# Patient Record
Sex: FEMALE | Race: WHITE | NOT HISPANIC OR LATINO | Employment: FULL TIME | URBAN - METROPOLITAN AREA
[De-identification: names, ages, dates, MRNs, and addresses within clinical notes are randomized per-mention and may not be internally consistent; named-entity substitution may affect disease eponyms.]

---

## 2019-05-15 ENCOUNTER — HISTORICAL (OUTPATIENT)
Dept: LAB | Facility: HOSPITAL | Age: 29
End: 2019-05-15

## 2019-06-11 ENCOUNTER — HISTORICAL (OUTPATIENT)
Dept: LAB | Facility: HOSPITAL | Age: 29
End: 2019-06-11

## 2019-07-19 ENCOUNTER — HISTORICAL (OUTPATIENT)
Dept: LAB | Facility: HOSPITAL | Age: 29
End: 2019-07-19

## 2019-10-01 ENCOUNTER — HISTORICAL (OUTPATIENT)
Dept: ADMINISTRATIVE | Facility: HOSPITAL | Age: 29
End: 2019-10-01

## 2019-10-09 ENCOUNTER — HISTORICAL (OUTPATIENT)
Dept: LAB | Facility: HOSPITAL | Age: 29
End: 2019-10-09

## 2019-11-19 ENCOUNTER — HISTORICAL (OUTPATIENT)
Dept: LAB | Facility: HOSPITAL | Age: 29
End: 2019-11-19

## 2019-11-19 LAB
ABS NEUT (OLG): 7.18 X10(3)/MCL (ref 2.1–9.2)
BASOPHILS # BLD AUTO: 0 X10(3)/MCL (ref 0–0.2)
BASOPHILS NFR BLD AUTO: 0 %
EOSINOPHIL # BLD AUTO: 0 X10(3)/MCL (ref 0–0.9)
EOSINOPHIL NFR BLD AUTO: 0 %
ERYTHROCYTE [DISTWIDTH] IN BLOOD BY AUTOMATED COUNT: 13.3 % (ref 11.5–17)
HCT VFR BLD AUTO: 36.6 % (ref 37–47)
HGB BLD-MCNC: 11.8 GM/DL (ref 12–16)
HIV 1+2 AB+HIV1 P24 AG SERPL QL IA: NEGATIVE
IMM GRANULOCYTES # BLD AUTO: 0.03 % (ref 0–0.02)
IMM GRANULOCYTES NFR BLD AUTO: 0.3 % (ref 0–0.43)
LYMPHOCYTES # BLD AUTO: 1.4 X10(3)/MCL (ref 0.6–4.6)
LYMPHOCYTES NFR BLD AUTO: 15 %
MCH RBC QN AUTO: 27.1 PG (ref 27–31)
MCHC RBC AUTO-ENTMCNC: 32.2 GM/DL (ref 33–36)
MCV RBC AUTO: 84.1 FL (ref 80–94)
MONOCYTES # BLD AUTO: 0.7 X10(3)/MCL (ref 0.1–1.3)
MONOCYTES NFR BLD AUTO: 8 %
NEUTROPHILS # BLD AUTO: 7.18 X10(3)/MCL (ref 1.4–7.9)
NEUTROPHILS NFR BLD AUTO: 76 %
PLATELET # BLD AUTO: 259 X10(3)/MCL (ref 130–400)
PMV BLD AUTO: 10.4 FL (ref 9.4–12.4)
RBC # BLD AUTO: 4.35 X10(6)/MCL (ref 4.2–5.4)
T PALLIDUM AB SER QL: NORMAL
WBC # SPEC AUTO: 9.4 X10(3)/MCL (ref 4.5–11.5)

## 2019-11-20 ENCOUNTER — HISTORICAL (OUTPATIENT)
Dept: LAB | Facility: HOSPITAL | Age: 29
End: 2019-11-20

## 2019-12-13 LAB
GLUCOSE UR QL STRIP: NEGATIVE
PROTEIN, POC: NEGATIVE

## 2021-10-21 ENCOUNTER — HISTORICAL (OUTPATIENT)
Dept: RADIOLOGY | Facility: HOSPITAL | Age: 31
End: 2021-10-21

## 2022-04-07 ENCOUNTER — HISTORICAL (OUTPATIENT)
Dept: ADMINISTRATIVE | Facility: HOSPITAL | Age: 32
End: 2022-04-07
Payer: MEDICAID

## 2022-04-23 VITALS
BODY MASS INDEX: 35.16 KG/M2 | DIASTOLIC BLOOD PRESSURE: 80 MMHG | HEIGHT: 68 IN | WEIGHT: 232 LBS | SYSTOLIC BLOOD PRESSURE: 125 MMHG

## 2022-09-15 ENCOUNTER — HISTORICAL (OUTPATIENT)
Dept: ADMINISTRATIVE | Facility: HOSPITAL | Age: 32
End: 2022-09-15
Payer: MEDICAID

## 2023-09-06 ENCOUNTER — HOSPITAL ENCOUNTER (OUTPATIENT)
Dept: RADIOLOGY | Facility: HOSPITAL | Age: 33
Discharge: HOME | End: 2023-09-06
Attending: OBSTETRICS & GYNECOLOGY
Payer: MEDICAID

## 2023-09-06 DIAGNOSIS — R10.2 PELVIC PAIN: ICD-10-CM

## 2023-09-06 PROCEDURE — 76856 US EXAM PELVIC COMPLETE: CPT | Mod: TC

## 2023-09-12 ENCOUNTER — LAB VISIT (OUTPATIENT)
Dept: LAB | Facility: HOSPITAL | Age: 33
End: 2023-09-12
Attending: OBSTETRICS & GYNECOLOGY
Payer: MEDICAID

## 2023-09-12 DIAGNOSIS — N83.209 CYST OF OVARY, UNSPECIFIED LATERALITY: ICD-10-CM

## 2023-09-12 DIAGNOSIS — R19.07 GENERALIZED SWELLING, MASS, OR LUMP OF ABDOMEN OR PELVIS: ICD-10-CM

## 2023-09-12 LAB — CANCER AG125 SERPL-ACNC: 16.2 UNIT/ML (ref 0–35)

## 2023-09-12 PROCEDURE — 86304 IMMUNOASSAY TUMOR CA 125: CPT

## 2023-09-12 PROCEDURE — 36415 COLL VENOUS BLD VENIPUNCTURE: CPT

## 2023-10-24 ENCOUNTER — HOSPITAL ENCOUNTER (OUTPATIENT)
Dept: RADIOLOGY | Facility: HOSPITAL | Age: 33
Discharge: HOME | End: 2023-10-24
Attending: OBSTETRICS & GYNECOLOGY
Payer: MEDICAID

## 2023-10-24 DIAGNOSIS — Z01.812 PRE-OPERATIVE LABORATORY EXAMINATION: ICD-10-CM

## 2023-10-24 PROCEDURE — 71046 X-RAY EXAM CHEST 2 VIEWS: CPT | Mod: TC

## 2023-10-30 ENCOUNTER — ANESTHESIA EVENT (OUTPATIENT)
Dept: SURGERY | Facility: HOSPITAL | Age: 33
DRG: 743 | End: 2023-10-30
Payer: MEDICAID

## 2023-10-31 NOTE — H&P
Patient ID: Monika Kimble is a 33 y.o. female.    Chief Complaint: pain    HPI:  Monika Kimble is a 33 y.o. female No obstetric history on file. presents for gyn visit she states she has pain all the time and something has to be done. Her us shows 10cm cyst w solid componets and left simple cyst. She wants to have hysterectomy.    Review of Systems  Pelvic pain   Denies CP NV SOB FEVER CHILLS CONSTIpation diarhea depression skin changes    No current facility-administered medications for this encounter.     Current Outpatient Medications   Medication Sig Dispense Refill    ELURYNG 0.12-0.015 mg/24 hr vaginal ring SMARTSI Ring Vaginal Every 4 Weeks 1 each 11    amoxicillin (AMOXIL) 500 MG capsule Take 500 mg by mouth 4 (four) times daily.      atorvastatin (LIPITOR) 10 MG tablet Take 10 mg by mouth once daily.      EScitalopram oxalate (LEXAPRO) 5 MG Tab Take 5 mg by mouth every morning.      ferrous sulfate 325 (65 FE) MG EC tablet Take 1 tablet (325 mg total) by mouth once daily. 30 tablet 3    traZODone (DESYREL) 150 MG tablet Take 150 mg by mouth every evening.         Review of patient's allergies indicates:   Allergen Reactions    Meperidine Hives       Past Medical History:   Diagnosis Date    Abdominal pain     Follicular cyst of ovary     Obesity     Pelvic pain in female        Past Surgical History:   Procedure Laterality Date    CHOLECYSTECTOMY      reconstructive arm surgery      SKIN GRAFT      TONSILLECTOMY         COMPLETED  Family history is reviewed and has not changed     Social History     Socioeconomic History    Marital status: Single   Tobacco Use    Smoking status: Every Day     Types: Vaping with nicotine    Smokeless tobacco: Never   Substance and Sexual Activity    Alcohol use: Not Currently    Drug use: Never       There were no vitals filed for this visit.    Physical Exam  APPEARANCE: Well nourished, well developed, in no acute distress.  AFFECT: WNL, alert and oriented x  3  ABDOMEN: Soft.  No tenderness or masses.  No hepatosplenomegaly.  No hernias.  BREASTS: Symmetrical, no skin changes or visible lesions.  No palpable masses, nipple discharge bilaterally.  PELVIC: Normal external genitalia without lesions.  Normal hair distribution.  Adequate perineal body, normal urethral meatus.  Vagina moist and well rugated without lesions or discharge.  Cervix pink, without lesions, discharge or tenderness.  No significant cystocele or rectocele.  Bimanual exam shows uterus to be normal size, regular, mobile and nontender.  Left Adnexa without masses or tenderness.  Right adnexa tender to deep palpation likely cyst  EXTREMITIES: No edema  Assessment & Plan:  Admit to [facility] for javon bso  Rba to this major surgery discussed   Time taken for Q&A   Pt read cosents and read back to me   She signed      No changes to h&p please proceed

## 2023-11-01 NOTE — PRE-PROCEDURE INSTRUCTIONS
[facility]: Outpatient Surgery  Preprocedure Check-In Instructions     Your arrival time for your surgery or procedure is __5____.  We ask patients to arrive about 2 hours before surgery to allow for enough time to review your health history & medications, start your IV, complete any outstanding labwork or tests, and meet your Anesthesiologist.  You will arrive at [facility], [address] [place] [address].  Enter through the [address] entrance next to the Emergency Room, and come to the 6th floor to the Outpatient Surgery Department.     Visitory Policy:  You are allowed 2 adult visitors to be with you in the hospital. All hospital visitors should be in good current health.  No small children.     What to Bring:  Please have your ID, insurance cards, and all home medication bottles with you at check in.  Bring your CPAP machine if one is used at home.     Fasting:  Nothing to eat or drink after midnight the night before your procedure. This includes no ice, gum, hard candies, and/or tobacco products.  Follow your doctor's instructions for taking any medications on the morning of your procedure.  If no instructions for taking medications were given, do not take any medications but bring your medications in their bottles to your procedure check in.     Follow your doctor's preoperative instructions regarding skin prep, bowel prep, bathing, or showering prior to your procedure.  If any special soaps were provided to you, please use according to your doctor's instructions. If no instructions were given from your doctor, take a good bath or shower with antibacterial soap the night before and the morning of your procedure.  On the morning of procedure, wear loose, comfortable clothing.  No lotions, makeup, perfumes, colognes, deodorant, or jewelry to your procedure.  Removable items (glasses, contact lenses, dentures, retainers, hearing aids) need to be removed for your procedure.   Bring your storage containers for these items if you wear them.     Artificial nails, body jewelry, eyelash extensions, and/or hair extensions with metal clips are not allowed during your surgery.  If you currently wear any of these items, please arrange for them to be removed prior to your arrival to the hospital.     Outpatient or Same Day Surgeries:  Any patients receiving sedation/anesthesia are advised not to drive for 24 hours after their procedure.  We do not allow patients to drive themselves home after discharge.  If you are going home after your procedure, please have someone available to drive you home from the hospital.        You may call the Outpatient Surgery Department at [phone] with any questions or concerns.  We are looking forward to meeting you and taking great care of you for your procedure.  Thank you for choosing [facility] for your surgical needs.

## 2023-11-02 ENCOUNTER — HOSPITAL ENCOUNTER (INPATIENT)
Facility: HOSPITAL | Age: 33
LOS: 2 days | Discharge: HOME | DRG: 743 | End: 2023-11-04
Attending: OBSTETRICS & GYNECOLOGY | Admitting: OBSTETRICS & GYNECOLOGY
Payer: MEDICAID

## 2023-11-02 ENCOUNTER — ANESTHESIA (OUTPATIENT)
Dept: SURGERY | Facility: HOSPITAL | Age: 33
DRG: 743 | End: 2023-11-02
Payer: MEDICAID

## 2023-11-02 DIAGNOSIS — N83.00 FOLLICULAR CYST OF OVARY: ICD-10-CM

## 2023-11-02 DIAGNOSIS — R10.2 PELVIC PAIN IN FEMALE: ICD-10-CM

## 2023-11-02 DIAGNOSIS — N92.0 MENORRHAGIA: ICD-10-CM

## 2023-11-02 LAB
B-HCG UR QL: NEGATIVE
CTP QC/QA: YES
POCT GLUCOSE: 91 MG/DL (ref 70–110)

## 2023-11-02 PROCEDURE — 37000009 HC ANESTHESIA EA ADD 15 MINS: Performed by: OBSTETRICS & GYNECOLOGY

## 2023-11-02 PROCEDURE — 11000001 HC ACUTE MED/SURG PRIVATE ROOM

## 2023-11-02 PROCEDURE — 36000708 HC OR TIME LEV III 1ST 15 MIN: Performed by: OBSTETRICS & GYNECOLOGY

## 2023-11-02 PROCEDURE — 63600175 PHARM REV CODE 636 W HCPCS: Performed by: OBSTETRICS & GYNECOLOGY

## 2023-11-02 PROCEDURE — 36000709 HC OR TIME LEV III EA ADD 15 MIN: Performed by: OBSTETRICS & GYNECOLOGY

## 2023-11-02 PROCEDURE — 81025 URINE PREGNANCY TEST: CPT | Performed by: OBSTETRICS & GYNECOLOGY

## 2023-11-02 PROCEDURE — D9220A PRA ANESTHESIA: Mod: CRNA,,,

## 2023-11-02 PROCEDURE — 63600175 PHARM REV CODE 636 W HCPCS

## 2023-11-02 PROCEDURE — 25000003 PHARM REV CODE 250: Performed by: OBSTETRICS & GYNECOLOGY

## 2023-11-02 PROCEDURE — A4216 STERILE WATER/SALINE, 10 ML: HCPCS

## 2023-11-02 PROCEDURE — 25000003 PHARM REV CODE 250

## 2023-11-02 PROCEDURE — 88307 TISSUE EXAM BY PATHOLOGIST: CPT | Performed by: OBSTETRICS & GYNECOLOGY

## 2023-11-02 PROCEDURE — D9220A PRA ANESTHESIA: ICD-10-PCS | Mod: CRNA,,,

## 2023-11-02 PROCEDURE — 63600175 PHARM REV CODE 636 W HCPCS: Performed by: ANESTHESIOLOGY

## 2023-11-02 PROCEDURE — 27201423 OPTIME MED/SURG SUP & DEVICES STERILE SUPPLY: Performed by: OBSTETRICS & GYNECOLOGY

## 2023-11-02 PROCEDURE — D9220A PRA ANESTHESIA: ICD-10-PCS | Mod: ANES,,, | Performed by: ANESTHESIOLOGY

## 2023-11-02 PROCEDURE — 37000008 HC ANESTHESIA 1ST 15 MINUTES: Performed by: OBSTETRICS & GYNECOLOGY

## 2023-11-02 PROCEDURE — D9220A PRA ANESTHESIA: Mod: ANES,,, | Performed by: ANESTHESIOLOGY

## 2023-11-02 PROCEDURE — 25000003 PHARM REV CODE 250: Performed by: ANESTHESIOLOGY

## 2023-11-02 PROCEDURE — 71000033 HC RECOVERY, INTIAL HOUR: Performed by: OBSTETRICS & GYNECOLOGY

## 2023-11-02 RX ORDER — GABAPENTIN 300 MG/1
600 CAPSULE ORAL
Status: COMPLETED | OUTPATIENT
Start: 2023-11-02 | End: 2023-11-02

## 2023-11-02 RX ORDER — ONDANSETRON 4 MG/1
8 TABLET, ORALLY DISINTEGRATING ORAL EVERY 8 HOURS PRN
Status: DISCONTINUED | OUTPATIENT
Start: 2023-11-02 | End: 2023-11-04 | Stop reason: HOSPADM

## 2023-11-02 RX ORDER — SODIUM CHLORIDE 9 MG/ML
INJECTION, SOLUTION INTRAVENOUS CONTINUOUS
Status: ACTIVE | OUTPATIENT
Start: 2023-11-02

## 2023-11-02 RX ORDER — LABETALOL HYDROCHLORIDE 5 MG/ML
INJECTION, SOLUTION INTRAVENOUS
Status: DISPENSED
Start: 2023-11-02 | End: 2023-11-02

## 2023-11-02 RX ORDER — MIDAZOLAM HYDROCHLORIDE 1 MG/ML
INJECTION INTRAMUSCULAR; INTRAVENOUS
Status: DISCONTINUED | OUTPATIENT
Start: 2023-11-02 | End: 2023-11-02

## 2023-11-02 RX ORDER — KETOROLAC TROMETHAMINE 30 MG/ML
30 INJECTION, SOLUTION INTRAMUSCULAR; INTRAVENOUS EVERY 8 HOURS
Status: COMPLETED | OUTPATIENT
Start: 2023-11-02 | End: 2023-11-03

## 2023-11-02 RX ORDER — HYDROMORPHONE HYDROCHLORIDE 2 MG/ML
INJECTION, SOLUTION INTRAMUSCULAR; INTRAVENOUS; SUBCUTANEOUS
Status: DISCONTINUED | OUTPATIENT
Start: 2023-11-02 | End: 2023-11-02

## 2023-11-02 RX ORDER — MUPIROCIN 20 MG/G
OINTMENT TOPICAL
Status: DISPENSED | OUTPATIENT
Start: 2023-11-02

## 2023-11-02 RX ORDER — IBUPROFEN 600 MG/1
600 TABLET ORAL EVERY 6 HOURS
Status: DISCONTINUED | OUTPATIENT
Start: 2023-11-03 | End: 2023-11-04 | Stop reason: HOSPADM

## 2023-11-02 RX ORDER — LABETALOL HYDROCHLORIDE 5 MG/ML
10 INJECTION, SOLUTION INTRAVENOUS ONCE
Status: COMPLETED | OUTPATIENT
Start: 2023-11-02 | End: 2023-11-02

## 2023-11-02 RX ORDER — ONDANSETRON 2 MG/ML
4 INJECTION INTRAMUSCULAR; INTRAVENOUS
Status: COMPLETED | OUTPATIENT
Start: 2023-11-02 | End: 2023-11-02

## 2023-11-02 RX ORDER — SODIUM CHLORIDE, SODIUM LACTATE, POTASSIUM CHLORIDE, CALCIUM CHLORIDE 600; 310; 30; 20 MG/100ML; MG/100ML; MG/100ML; MG/100ML
INJECTION, SOLUTION INTRAVENOUS CONTINUOUS
Status: DISCONTINUED | OUTPATIENT
Start: 2023-11-02 | End: 2023-11-04 | Stop reason: HOSPADM

## 2023-11-02 RX ORDER — PROCHLORPERAZINE EDISYLATE 5 MG/ML
5 INJECTION INTRAMUSCULAR; INTRAVENOUS EVERY 6 HOURS PRN
Status: DISCONTINUED | OUTPATIENT
Start: 2023-11-02 | End: 2023-11-04 | Stop reason: HOSPADM

## 2023-11-02 RX ORDER — PROPOFOL 10 MG/ML
VIAL (ML) INTRAVENOUS
Status: DISCONTINUED | OUTPATIENT
Start: 2023-11-02 | End: 2023-11-02

## 2023-11-02 RX ORDER — OXYCODONE AND ACETAMINOPHEN 10; 325 MG/1; MG/1
1 TABLET ORAL EVERY 4 HOURS PRN
Status: DISCONTINUED | OUTPATIENT
Start: 2023-11-02 | End: 2023-11-04 | Stop reason: HOSPADM

## 2023-11-02 RX ORDER — DIPHENHYDRAMINE HCL 25 MG
25 CAPSULE ORAL EVERY 4 HOURS PRN
Status: DISCONTINUED | OUTPATIENT
Start: 2023-11-02 | End: 2023-11-04 | Stop reason: HOSPADM

## 2023-11-02 RX ORDER — ACETAMINOPHEN 500 MG
1000 TABLET ORAL
Status: COMPLETED | OUTPATIENT
Start: 2023-11-02 | End: 2023-11-02

## 2023-11-02 RX ORDER — FAMOTIDINE 20 MG/1
20 TABLET, FILM COATED ORAL
Status: COMPLETED | OUTPATIENT
Start: 2023-11-02 | End: 2023-11-02

## 2023-11-02 RX ORDER — HYDROMORPHONE HYDROCHLORIDE 2 MG/ML
0.4 INJECTION, SOLUTION INTRAMUSCULAR; INTRAVENOUS; SUBCUTANEOUS EVERY 5 MIN PRN
Status: DISCONTINUED | OUTPATIENT
Start: 2023-11-02 | End: 2023-11-02 | Stop reason: HOSPADM

## 2023-11-02 RX ORDER — BISACODYL 10 MG
10 SUPPOSITORY, RECTAL RECTAL DAILY PRN
Status: DISCONTINUED | OUTPATIENT
Start: 2023-11-02 | End: 2023-11-04 | Stop reason: HOSPADM

## 2023-11-02 RX ORDER — CEFAZOLIN 2 G/1
INJECTION, POWDER, FOR SOLUTION INTRAMUSCULAR; INTRAVENOUS
Status: DISCONTINUED | OUTPATIENT
Start: 2023-11-02 | End: 2023-11-02

## 2023-11-02 RX ORDER — OXYCODONE AND ACETAMINOPHEN 5; 325 MG/1; MG/1
1 TABLET ORAL EVERY 4 HOURS PRN
Status: DISCONTINUED | OUTPATIENT
Start: 2023-11-02 | End: 2023-11-04 | Stop reason: HOSPADM

## 2023-11-02 RX ORDER — ONDANSETRON 2 MG/ML
4 INJECTION INTRAMUSCULAR; INTRAVENOUS ONCE AS NEEDED
Status: DISCONTINUED | OUTPATIENT
Start: 2023-11-02 | End: 2023-11-02 | Stop reason: HOSPADM

## 2023-11-02 RX ORDER — CEFAZOLIN SODIUM 2 G/50ML
2 SOLUTION INTRAVENOUS
Status: DISPENSED | OUTPATIENT
Start: 2023-11-02

## 2023-11-02 RX ORDER — LIDOCAINE HYDROCHLORIDE 20 MG/ML
INJECTION, SOLUTION EPIDURAL; INFILTRATION; INTRACAUDAL; PERINEURAL
Status: DISCONTINUED | OUTPATIENT
Start: 2023-11-02 | End: 2023-11-02

## 2023-11-02 RX ORDER — LIDOCAINE HYDROCHLORIDE 10 MG/ML
1 INJECTION, SOLUTION EPIDURAL; INFILTRATION; INTRACAUDAL; PERINEURAL ONCE
Status: DISCONTINUED | OUTPATIENT
Start: 2023-11-02 | End: 2023-11-02 | Stop reason: HOSPADM

## 2023-11-02 RX ORDER — DOCUSATE SODIUM 100 MG/1
100 CAPSULE, LIQUID FILLED ORAL 2 TIMES DAILY
Status: DISCONTINUED | OUTPATIENT
Start: 2023-11-02 | End: 2023-11-04 | Stop reason: HOSPADM

## 2023-11-02 RX ORDER — ROCURONIUM BROMIDE 10 MG/ML
INJECTION, SOLUTION INTRAVENOUS
Status: DISCONTINUED | OUTPATIENT
Start: 2023-11-02 | End: 2023-11-02

## 2023-11-02 RX ORDER — DEXAMETHASONE SODIUM PHOSPHATE 4 MG/ML
INJECTION, SOLUTION INTRA-ARTICULAR; INTRALESIONAL; INTRAMUSCULAR; INTRAVENOUS; SOFT TISSUE
Status: DISCONTINUED | OUTPATIENT
Start: 2023-11-02 | End: 2023-11-02

## 2023-11-02 RX ORDER — HYDROMORPHONE HYDROCHLORIDE 2 MG/ML
1 INJECTION, SOLUTION INTRAMUSCULAR; INTRAVENOUS; SUBCUTANEOUS EVERY 6 HOURS PRN
Status: DISCONTINUED | OUTPATIENT
Start: 2023-11-02 | End: 2023-11-04 | Stop reason: HOSPADM

## 2023-11-02 RX ORDER — DIPHENHYDRAMINE HYDROCHLORIDE 50 MG/ML
25 INJECTION INTRAMUSCULAR; INTRAVENOUS EVERY 4 HOURS PRN
Status: DISCONTINUED | OUTPATIENT
Start: 2023-11-02 | End: 2023-11-04 | Stop reason: HOSPADM

## 2023-11-02 RX ORDER — FENTANYL CITRATE 50 UG/ML
INJECTION, SOLUTION INTRAMUSCULAR; INTRAVENOUS
Status: DISCONTINUED | OUTPATIENT
Start: 2023-11-02 | End: 2023-11-02

## 2023-11-02 RX ORDER — MUPIROCIN 20 MG/G
OINTMENT TOPICAL 2 TIMES DAILY
Status: DISCONTINUED | OUTPATIENT
Start: 2023-11-02 | End: 2023-11-04 | Stop reason: HOSPADM

## 2023-11-02 RX ORDER — CELECOXIB 200 MG/1
200 CAPSULE ORAL
Status: COMPLETED | OUTPATIENT
Start: 2023-11-02 | End: 2023-11-02

## 2023-11-02 RX ADMIN — DOCUSATE SODIUM 100 MG: 100 CAPSULE, LIQUID FILLED ORAL at 10:11

## 2023-11-02 RX ADMIN — ACETAMINOPHEN 1000 MG: 500 TABLET, FILM COATED ORAL at 06:11

## 2023-11-02 RX ADMIN — MUPIROCIN: 20 OINTMENT TOPICAL at 06:11

## 2023-11-02 RX ADMIN — LABETALOL HYDROCHLORIDE 10 MG: 5 INJECTION, SOLUTION INTRAVENOUS at 10:11

## 2023-11-02 RX ADMIN — FAMOTIDINE 20 MG: 20 TABLET, FILM COATED ORAL at 06:11

## 2023-11-02 RX ADMIN — PROPOFOL 160 MG: 10 INJECTION, EMULSION INTRAVENOUS at 07:11

## 2023-11-02 RX ADMIN — DEXMEDETOMIDINE 10 MCG: 200 INJECTION, SOLUTION INTRAVENOUS at 08:11

## 2023-11-02 RX ADMIN — HYDROMORPHONE HYDROCHLORIDE 0.3 MG: 2 INJECTION, SOLUTION INTRAMUSCULAR; INTRAVENOUS; SUBCUTANEOUS at 09:11

## 2023-11-02 RX ADMIN — ROCURONIUM BROMIDE 10 MG: 10 SOLUTION INTRAVENOUS at 08:11

## 2023-11-02 RX ADMIN — HYDROMORPHONE HYDROCHLORIDE 0.5 MG: 2 INJECTION, SOLUTION INTRAMUSCULAR; INTRAVENOUS; SUBCUTANEOUS at 08:11

## 2023-11-02 RX ADMIN — HYDROMORPHONE HYDROCHLORIDE 0.4 MG: 2 INJECTION INTRAMUSCULAR; INTRAVENOUS; SUBCUTANEOUS at 09:11

## 2023-11-02 RX ADMIN — KETOROLAC TROMETHAMINE 30 MG: 30 INJECTION INTRAMUSCULAR; INTRAVENOUS at 02:11

## 2023-11-02 RX ADMIN — HYDROMORPHONE HYDROCHLORIDE 0.4 MG: 2 INJECTION, SOLUTION INTRAMUSCULAR; INTRAVENOUS; SUBCUTANEOUS at 08:11

## 2023-11-02 RX ADMIN — MIDAZOLAM HYDROCHLORIDE 2 MG: 1 INJECTION, SOLUTION INTRAMUSCULAR; INTRAVENOUS at 07:11

## 2023-11-02 RX ADMIN — CEFAZOLIN 2 G: 2 INJECTION, POWDER, FOR SOLUTION INTRAMUSCULAR; INTRAVENOUS at 07:11

## 2023-11-02 RX ADMIN — ROCURONIUM BROMIDE 20 MG: 10 SOLUTION INTRAVENOUS at 08:11

## 2023-11-02 RX ADMIN — SODIUM CHLORIDE, SODIUM GLUCONATE, SODIUM ACETATE, POTASSIUM CHLORIDE AND MAGNESIUM CHLORIDE: 526; 502; 368; 37; 30 INJECTION, SOLUTION INTRAVENOUS at 07:11

## 2023-11-02 RX ADMIN — FENTANYL CITRATE 100 MCG: 50 INJECTION, SOLUTION INTRAMUSCULAR; INTRAVENOUS at 07:11

## 2023-11-02 RX ADMIN — DIPHENHYDRAMINE HYDROCHLORIDE 25 MG: 50 INJECTION INTRAMUSCULAR; INTRAVENOUS at 10:11

## 2023-11-02 RX ADMIN — ONDANSETRON 4 MG: 2 INJECTION INTRAMUSCULAR; INTRAVENOUS at 08:11

## 2023-11-02 RX ADMIN — HYDROMORPHONE HYDROCHLORIDE 0.8 MG: 2 INJECTION, SOLUTION INTRAMUSCULAR; INTRAVENOUS; SUBCUTANEOUS at 08:11

## 2023-11-02 RX ADMIN — CELECOXIB 200 MG: 200 CAPSULE ORAL at 06:11

## 2023-11-02 RX ADMIN — GABAPENTIN 600 MG: 300 CAPSULE ORAL at 06:11

## 2023-11-02 RX ADMIN — SODIUM CHLORIDE, POTASSIUM CHLORIDE, SODIUM LACTATE AND CALCIUM CHLORIDE: 600; 310; 30; 20 INJECTION, SOLUTION INTRAVENOUS at 10:11

## 2023-11-02 RX ADMIN — KETOROLAC TROMETHAMINE 30 MG: 30 INJECTION INTRAMUSCULAR; INTRAVENOUS at 10:11

## 2023-11-02 RX ADMIN — SUGAMMADEX 200 MG: 100 INJECTION, SOLUTION INTRAVENOUS at 09:11

## 2023-11-02 RX ADMIN — DEXAMETHASONE SODIUM PHOSPHATE 8 MG: 4 INJECTION, SOLUTION INTRA-ARTICULAR; INTRALESIONAL; INTRAMUSCULAR; INTRAVENOUS; SOFT TISSUE at 07:11

## 2023-11-02 RX ADMIN — OXYCODONE HYDROCHLORIDE AND ACETAMINOPHEN 1 TABLET: 5; 325 TABLET ORAL at 07:11

## 2023-11-02 RX ADMIN — LIDOCAINE HYDROCHLORIDE 60 ML: 20 INJECTION, SOLUTION EPIDURAL; INFILTRATION; INTRACAUDAL; PERINEURAL at 07:11

## 2023-11-02 RX ADMIN — ROCURONIUM BROMIDE 70 MG: 10 SOLUTION INTRAVENOUS at 07:11

## 2023-11-02 NOTE — ANESTHESIA PROCEDURE NOTES
Intubation    Date/Time: 11/2/2023 7:24 AM    Performed by: Anthony Goodrich CRNA  Authorized by: Leonides Alvarez MD    Intubation:     Induction:  Intravenous    Intubated:  Postinduction    Mask Ventilation:  Easy mask    Attempts:  1    Attempted By:  Student    Method of Intubation:  Direct    Blade:  Muna 3    Laryngeal View Grade: Grade I - full view of cords      Difficult Airway Encountered?: No      Complications:  None    Airway Device:  Oral endotracheal tube    Airway Device Size:  7.5    Style/Cuff Inflation:  Cuffed (inflated to minimal occlusive pressure)    Inflation Amount (mL):  7    Tube secured:  22    Secured at:  The lips    Placement Verified By:  Capnometry    Complicating Factors:  None    Findings Post-Intubation:  BS equal bilateral and atraumatic/condition of teeth unchanged

## 2023-11-02 NOTE — OP NOTE
[facility]                       [address]                      [place] [address]    PATIENT NAME:      JIAN KIMBLE   YOB: 1990  CSN:               [id number]  MRN:               [id number]  ADMIT DATE:        11/02/2023 05:00:00  PHYSICIAN:         James Trinidad MD                          OPERATIVE REPORT      DATE OF SURGERY:    11/02/2023 00:00:00    SURGEON:  James Trinidad MD    PREOPERATIVE DIAGNOSIS:  33-year-old female with right adnexal mass, pelvic   pain, desire for definitive therapy.    POSTOPERATIVE DIAGNOSES:  33-year-old female with right adnexal mass, pelvic   pain, desire for definitive therapy, and right hydrosalpinx.    OPERATION:  Total abdominal hysterectomy, right salpingectomy, left   salpingectomy, lysis of pelvic adhesions.    FIRST ASSISTANT:  Gene Trinidad MD.    SECOND ASSISTANT:  Dr. Thomas, [employer] residency, PGY-4.    THIRD ASSISTANT:  Dr. Rodriguez, [employer] residency, PGY-3.    ANESTHESIA:  General, Anthony.    CRNA:  Anesthesiologist    BLOOD LOSS:  Minimal, 50 cc.    DRAINS:  Santana catheter, 200 cc clear urine.    FLUIDS:  Crystalloid 2 L.    ANTIBIOTICS:  Ancef.    PREGNANCY TEST:  Negative day of the surgery.    FINDINGS:  Normal uterus and cervix.  Right hydrosalpinx roughly 8-10 cm in   diameter with a green mucinous like material in the cyst.  Otherwise, normal   right adnexa, normal left adnexa.  Left fallopian tube had a small solid   component to it, most likely fibroid.  Filmy pelvic adhesions from the right   hydrosalpinx to the pelvic sidewall.  Otherwise, smooth peritoneal surfaces,   freely mobile bowel contents.    PROCEDURE IN DETAIL:  The risks, benefits, and alternatives to this procedure   were explained in detail to Ms. Kimble.  She verbalized understanding and   consents were signed.  She was taken to the operating theater with intravenous   fluids running and placed on the  table in a dorsal supine position, general   anesthesia achieved without difficulty.  She was prepped and draped in usual   sterile fashion.  Santana catheter placed, SCDs placed.  A 10 cm Pfannenstiel skin   incision was made 2 fingerbreadths above the symphysis pubis and carried down   to long layer of fascia sharply.  Fascia scored in the midline.  The fascial   incision was extended bilaterally sharply.  Superior aspect of the fascial   incision was grasped with Kocher clamps x2 and underlying rectus muscle   dissected off sharply.  The inferior aspect of the fascial incision grasped with   Kocher clamps x2 and underlying rectus muscle dissected off sharply.  The   rectus was  in the midline.  The parietal peritoneum was identified,   tented up, was entered sharply, extended inferiorly and superiorly.  The patient   was placed in steep Trendelenburg position.  A survey of the pelvis revealed   the findings previously discussed.  Mina self-retaining retractor placed.  The   bowel was packed with moist laparotomy sponges.  The uterus was grasped at the   cornu with [facility] clamps for traction.  At this time, I turned my attention   towards the large right hydrosalpinx and using a LigaSure Impact device, I was   able to mobilize it off the broad ligament.  Once we got to the vascular   component of it coming off the infundibulopelvic ligament, it was clamped with   Scarlett clamps x2 and before transecting, a small 2 mm incision was made on the   apex of the mass and I drained a green mucinous like fluid, 400 cc of fluid came   out of it, it was a greenish mucinous like material.  I had no spillage into   the pelvis.  At this time, I sharply removed the salpinx and suture ligated with   2-0 Vicryl suture Arnold stitch fashion and hemostasis was noted.  I then   turned my attention towards the right round ligament, which was then fulgurated   with the LigaSure device and transected.  We then fulgurated the  utero-ovarian   ligament, double burn technique LigaSure on the right side.  In a similar   fashion, the left round ligament was fulgurated double burn technique,   transected.  The left utero-ovarian ligament was fulgurated, transected.  The   anterior leaf of the broad ligament was brought off the uterus sharply with   Metzenbaum scissors and the bladder was pushed down as moist sponge on a stick.    Uterine arteries were skeletonized bilaterally and fulgurated with the LigaSure   device double burn technique and hemostasis noted.  We worked our way down the   cervix with the LigaSure device bilaterally getting to the level of the cardinal   ligaments, which were then clamped with straight Arnold clamp, transected,   suture ligated with 0 Vicryl suture bilaterally, hemostasis noted.  The   uterosacral ligaments were then clamped bilaterally with Arnold clamps,   transected, suture ligated.  Upon getting to the level of the external cervical   os, I came across with curved Arnold clamps removing the uterus and cervix from   the pelvis at this time.  The angles of the vagina were incorporated into the   uterosacral ligament pedicles with 0 Vicryl suture, the remainder of the cuff   was closed with 0 Vicryl suture in figure-of-eight fashion and hemostasis noted.    The left fallopian tube was grasped with a Maria Victoria clamp and came across the   LigaSure device, fulgurated and transected and hemostasis noted.  The pelvis was   then irrigated with copious amounts of irrigation.  The vaginal cuff was noted   to be hemostatic.  Round ligament pedicles noted to be hemostatic bilaterally.    Utero-ovarian ligament pedicles were noted to be hemostatic bilaterally.  The   laparotomy sponges were removed.  Ricky hemostatic agent was placed over the   vaginal cuff and vascular pedicles for added measure and remained white through   an observation period of 2 minutes.  Laparotomy sponge removed.  Bowel returned   to anatomical  position.  Mina removed.  Before putting the Ricky, I irrigated   the pelvis with copious amounts of irrigation.  We then reapproximated the   rectus muscle with 2-0 Vicryl suture in interrupted fashion, fascia   reapproximated with #1 Vicryl suture in running fashion.  Hemostasis was noted.    Subcu irrigated, reapproximated with 2-0 Vicryl suture.  Skin reapproximated   with 4-0 Vicryl suture in subcuticular fashion.  I injected with local   anesthetic followed by Dermabond glue.  Sponge, lap, instrument, and needle   counts correct x2.  The patient was cleaned and taken to the recovery room awake   and in stable condition.  Time was taken to speak with her mother.        ______________________________  MD TRENTON Skinner/AQS  DD:  11/02/2023  Time:  09:21AM  DT:  11/02/2023  Time:  09:54AM  Job #:  [id number]      OPERATIVE REPORT

## 2023-11-02 NOTE — ANESTHESIA PREPROCEDURE EVALUATION
11/01/2023  Monika Kimble is a 33 y.o., female , who presents for the following:    Procedure: HYSTERECTOMY, TOTAL, ABDOMINAL, WITH BILATERAL SALPINGO-OOPHORECTOMY (Abdomen) - URI POSS BSO // SUPINE   Anesthesia type: General   Diagnosis:        Pelvic pain in female [R10.2]       Follicular cyst of ovary [N83.00]   Pre-op diagnosis:        Pelvic pain in female [R10.2]       Follicular cyst of ovary [N83.00]   Location: [facility] OR  / [facility] OR   Surgeons: James Trinidad MD     LAB:   10/24 1517   HGB 11.9 Low        WBC 7.98       Platelets 286              K+ 4.0       Creatinine 0.75       Glucose 124 High             Pre-op Assessment    I have reviewed the Patient Summary Reports.     I have reviewed the Nursing Notes. I have reviewed the NPO Status.   I have reviewed the Medications.     Review of Systems  Anesthesia Hx:  No problems with previous Anesthesia  Denies Family Hx of Anesthesia complications.   Denies Personal Hx of Anesthesia complications.   Social:  Smoker    Cardiovascular:  Cardiovascular Normal     Pulmonary:  Pulmonary Normal    Endocrine:  Endocrine Normal        Physical Exam  General: Alert and Oriented    Airway:  Mallampati: II   Mouth Opening: Normal  TM Distance: Normal  Tongue: Normal  Neck ROM: Normal ROM    Chest/Lungs:  Normal Respiratory Rate    Heart:  Rate: Normal  Rhythm: Regular Rhythm        Anesthesia Plan  Type of Anesthesia, risks & benefits discussed:    Anesthesia Type: Gen ETT  Intra-op Monitoring Plan: Standard ASA Monitors  Post Op Pain Control Plan: IV/PO Opioids PRN and multimodal analgesia  Induction:  IV  Airway Plan: Direct, Post-Induction  Informed Consent: Informed consent signed with the Patient and all parties understand the risks and agree with anesthesia plan.  All questions answered. Patient consented to blood products? Yes  ASA Score: 2  Day  of Surgery Review of History & Physical: H&P Update referred to the surgeon/provider.  Anesthesia Plan Notes: ERAS    Ready For Surgery From Anesthesia Perspective.     .

## 2023-11-02 NOTE — ANESTHESIA POSTPROCEDURE EVALUATION
Anesthesia Post Evaluation    Patient: Monika Kimble    Procedure(s) Performed: Procedure(s) (LRB):  HYSTERECTOMY, TOTAL, ABDOMINAL, WITH BILATERAL SALPINGO-OOPHORECTOMY (N/A)    Final Anesthesia Type: general      Patient location during evaluation: PACU  Patient participation: Yes- Able to Participate  Level of consciousness: oriented and awake  Post-procedure vital signs: reviewed and stable  Pain management: adequate  Airway patency: patent    PONV status at discharge: No PONV  Anesthetic complications: no      Cardiovascular status: hemodynamically stable  Respiratory status: spontaneous ventilation and unassisted  Hydration status: euvolemic  Follow-up not needed.  Comments: [facility]          Vitals Value Taken Time   /77 11/02/23 1012   Temp 36.4 °C (97.6 °F) 11/02/23 0930   Pulse 90 11/02/23 1014   Resp 15 11/02/23 1014   SpO2 95 % 11/02/23 1014   Vitals shown include unvalidated device data.      Event Time   Out of Recovery 10:16:00         Pain/Renetta Score: Pain Rating Prior to Med Admin: 6 (11/2/2023  9:55 AM)  Renetta Score: 10 (11/2/2023 10:16 AM)

## 2023-11-02 NOTE — TRANSFER OF CARE
"Anesthesia Transfer of Care Note    Patient: Monika Kimble    Procedure(s) Performed: Procedure(s) (LRB):  HYSTERECTOMY, TOTAL, ABDOMINAL, WITH BILATERAL SALPINGO-OOPHORECTOMY (N/A)    Patient location: PACU    Anesthesia Type: general    Transport from OR: Transported from OR on room air with adequate spontaneous ventilation    Post pain: adequate analgesia    Post assessment: no apparent anesthetic complications    Post vital signs: stable    Level of consciousness: responds to stimulation    Nausea/Vomiting: no nausea/vomiting    Complications: none    Transfer of care protocol was followed      Last vitals:   Visit Vitals  /97   Pulse 80   Temp 36.4 °C (97.6 °F) (Temporal)   Resp 18   Ht 5' 8" (1.727 m)   Wt 115.9 kg (255 lb 8.2 oz)   LMP 09/18/2023   SpO2 95%   Breastfeeding No   BMI 38.85 kg/m²     "

## 2023-11-03 LAB
BASOPHILS # BLD AUTO: 0.02 X10(3)/MCL
BASOPHILS NFR BLD AUTO: 0.2 %
EOSINOPHIL # BLD AUTO: 0 X10(3)/MCL (ref 0–0.9)
EOSINOPHIL NFR BLD AUTO: 0 %
ERYTHROCYTE [DISTWIDTH] IN BLOOD BY AUTOMATED COUNT: 12.3 % (ref 11.5–17)
HCT VFR BLD AUTO: 31.6 % (ref 37–47)
HGB BLD-MCNC: 10.5 G/DL (ref 12–16)
IMM GRANULOCYTES # BLD AUTO: 0.03 X10(3)/MCL (ref 0–0.04)
IMM GRANULOCYTES NFR BLD AUTO: 0.3 %
LYMPHOCYTES # BLD AUTO: 1.99 X10(3)/MCL (ref 0.6–4.6)
LYMPHOCYTES NFR BLD AUTO: 19.1 %
MCH RBC QN AUTO: 28 PG (ref 27–31)
MCHC RBC AUTO-ENTMCNC: 33.2 G/DL (ref 33–36)
MCV RBC AUTO: 84.3 FL (ref 80–94)
MONOCYTES # BLD AUTO: 1.12 X10(3)/MCL (ref 0.1–1.3)
MONOCYTES NFR BLD AUTO: 10.8 %
NEUTROPHILS # BLD AUTO: 7.25 X10(3)/MCL (ref 2.1–9.2)
NEUTROPHILS NFR BLD AUTO: 69.6 %
NRBC BLD AUTO-RTO: 0 %
PLATELET # BLD AUTO: 227 X10(3)/MCL (ref 130–400)
PMV BLD AUTO: 10.5 FL (ref 7.4–10.4)
RBC # BLD AUTO: 3.75 X10(6)/MCL (ref 4.2–5.4)
WBC # SPEC AUTO: 10.41 X10(3)/MCL (ref 4.5–11.5)

## 2023-11-03 PROCEDURE — 85025 COMPLETE CBC W/AUTO DIFF WBC: CPT | Performed by: OBSTETRICS & GYNECOLOGY

## 2023-11-03 PROCEDURE — 11000001 HC ACUTE MED/SURG PRIVATE ROOM

## 2023-11-03 PROCEDURE — 25000003 PHARM REV CODE 250: Performed by: OBSTETRICS & GYNECOLOGY

## 2023-11-03 PROCEDURE — 63600175 PHARM REV CODE 636 W HCPCS: Performed by: OBSTETRICS & GYNECOLOGY

## 2023-11-03 RX ADMIN — IBUPROFEN 600 MG: 600 TABLET, FILM COATED ORAL at 09:11

## 2023-11-03 RX ADMIN — OXYCODONE AND ACETAMINOPHEN 1 TABLET: 10; 325 TABLET ORAL at 01:11

## 2023-11-03 RX ADMIN — KETOROLAC TROMETHAMINE 30 MG: 30 INJECTION INTRAMUSCULAR; INTRAVENOUS at 08:11

## 2023-11-03 RX ADMIN — DOCUSATE SODIUM 100 MG: 100 CAPSULE, LIQUID FILLED ORAL at 09:11

## 2023-11-03 RX ADMIN — IBUPROFEN 600 MG: 600 TABLET, FILM COATED ORAL at 02:11

## 2023-11-03 RX ADMIN — OXYCODONE AND ACETAMINOPHEN 1 TABLET: 10; 325 TABLET ORAL at 05:11

## 2023-11-03 RX ADMIN — DOCUSATE SODIUM 100 MG: 100 CAPSULE, LIQUID FILLED ORAL at 08:11

## 2023-11-03 NOTE — PLAN OF CARE
Problem: Adult Inpatient Plan of Care  Goal: Plan of Care Review  Outcome: Ongoing, Progressing  Goal: Patient-Specific Goal (Individualized)  Outcome: Ongoing, Progressing  Goal: Absence of Hospital-Acquired Illness or Injury  Outcome: Ongoing, Progressing  Goal: Optimal Comfort and Wellbeing  Outcome: Ongoing, Progressing  Goal: Readiness for Transition of Care  Outcome: Ongoing, Progressing     Problem: Infection  Goal: Absence of Infection Signs and Symptoms  Outcome: Ongoing, Progressing     Problem: Bleeding (Hysterectomy)  Goal: Absence of Bleeding  Outcome: Ongoing, Progressing     Problem: Fluid and Electrolyte Imbalance (Hysterectomy)  Goal: Fluid and Electrolyte Balance  Outcome: Ongoing, Progressing     Problem: Infection (Hysterectomy)  Goal: Absence of Infection Signs and Symptoms  Outcome: Ongoing, Progressing     Problem: Ongoing Anesthesia Effects (Hysterectomy)  Goal: Anesthesia/Sedation Recovery  Outcome: Ongoing, Progressing     Problem: Pain (Hysterectomy)  Goal: Acceptable Pain Control  Outcome: Ongoing, Progressing

## 2023-11-03 NOTE — PROGRESS NOTES
[facility]                       [address] [address]    PATIENT NAME:       JIAN KIMBLE   YOB: 1990  CSN:                [id number]   MRN:                [id number]  ADMIT DATE:         11/02/2023 05:00:00  PHYSICIAN:          James Trinidad MD                            PROGRESS NOTE    DATE:      SUBJECTIVE:  Ms. Kibmle was seen this afternoon.  She was trying to have a bowel   movement.  I did not rush her.  She was actively sitting on the toilet, trying   to have a BM.  I spoke with her through the door.  She denies chest pain,   shortness of breath, nausea, vomiting, fevers, and chills.    PHYSICAL EXAMINATION:  VITAL SIGNS:  Her vital signs are stable.  She is afebrile.  GENERAL:  She is alert, oriented x3.  ABDOMEN:  I did not examine her abdomen, but as per the nursing staff, it is   soft, nontender.  Bowel sounds can be heard.  Incision is clean, dry, intact.    EXTREMITIES:  Nontender.    LABORATORY DATA:  Postoperative H and H are 10.5 and 31.6.    ASSESSMENT AND PLAN:  This is a 33-year-old status post URI, bilateral   salpingectomy with a resection of a right hydrosalpinx postop day 1, doing well.    Ambulate the patient, regular diet.  Continue with current care plan.    Anticipate discharge home tomorrow.        ______________________________  James Trinidad MD    EPE/AQS  DD:  11/03/2023  Time:  02:37PM  DT:  11/03/2023  Time:  04:36PM  Job #:  [id number]      PROGRESS NOTE

## 2023-11-04 VITALS
HEART RATE: 80 BPM | WEIGHT: 255.5 LBS | RESPIRATION RATE: 16 BRPM | BODY MASS INDEX: 38.72 KG/M2 | HEIGHT: 68 IN | OXYGEN SATURATION: 96 % | SYSTOLIC BLOOD PRESSURE: 125 MMHG | TEMPERATURE: 99 F | DIASTOLIC BLOOD PRESSURE: 80 MMHG

## 2023-11-04 PROBLEM — R10.2 PELVIC PAIN IN FEMALE: Status: ACTIVE | Noted: 2023-11-04

## 2023-11-04 PROCEDURE — 25000003 PHARM REV CODE 250: Performed by: OBSTETRICS & GYNECOLOGY

## 2023-11-04 RX ORDER — OXYCODONE AND ACETAMINOPHEN 5; 325 MG/1; MG/1
1 TABLET ORAL EVERY 6 HOURS PRN
Qty: 28 TABLET | Refills: 0 | Status: SHIPPED | OUTPATIENT
Start: 2023-11-04

## 2023-11-04 RX ORDER — IBUPROFEN 600 MG/1
600 TABLET ORAL EVERY 6 HOURS PRN
Qty: 28 TABLET | Refills: 0 | Status: SHIPPED | OUTPATIENT
Start: 2023-11-04

## 2023-11-04 RX ADMIN — IBUPROFEN 600 MG: 600 TABLET, FILM COATED ORAL at 07:11

## 2023-11-04 RX ADMIN — IBUPROFEN 600 MG: 600 TABLET, FILM COATED ORAL at 02:11

## 2023-11-04 RX ADMIN — DOCUSATE SODIUM 100 MG: 100 CAPSULE, LIQUID FILLED ORAL at 07:11

## 2023-11-04 NOTE — PLAN OF CARE
Problem: Adult Inpatient Plan of Care  Goal: Plan of Care Review  Outcome: Ongoing, Progressing  Goal: Patient-Specific Goal (Individualized)  Outcome: Ongoing, Progressing  Goal: Absence of Hospital-Acquired Illness or Injury  Outcome: Ongoing, Progressing  Goal: Optimal Comfort and Wellbeing  Outcome: Ongoing, Progressing  Goal: Readiness for Transition of Care  Outcome: Ongoing, Progressing     Problem: Infection  Goal: Absence of Infection Signs and Symptoms  Outcome: Ongoing, Progressing     Problem: Bleeding (Hysterectomy)  Goal: Absence of Bleeding  Outcome: Ongoing, Progressing     Problem: Bowel Motility Impaired (Hysterectomy)  Goal: Effective Bowel Elimination  Outcome: Ongoing, Progressing     Problem: Fluid and Electrolyte Imbalance (Hysterectomy)  Goal: Fluid and Electrolyte Balance  Outcome: Ongoing, Progressing     Problem: Infection (Hysterectomy)  Goal: Absence of Infection Signs and Symptoms  Outcome: Ongoing, Progressing     Problem: Ongoing Anesthesia Effects (Hysterectomy)  Goal: Anesthesia/Sedation Recovery  Outcome: Ongoing, Progressing     Problem: Pain (Hysterectomy)  Goal: Acceptable Pain Control  Outcome: Ongoing, Progressing     Problem: Postoperative Nausea and Vomiting (Hysterectomy)  Goal: Nausea and Vomiting Relief  Outcome: Ongoing, Progressing     Problem: Postoperative Urinary Retention (Hysterectomy)  Goal: Effective Urinary Elimination  Outcome: Ongoing, Progressing     Problem: Respiratory Compromise (Hysterectomy)  Goal: Effective Oxygenation and Ventilation  Outcome: Ongoing, Progressing

## 2023-11-04 NOTE — DISCHARGE SUMMARY
[facility] - 3rd Floor Mother/Baby Unit  Obstetrics & Gynecology  Discharge Summary    Patient Name: Monika Kimble  MRN: [id number]  Admission Date: 11/2/2023  Hospital Length of Stay: 2 days  Discharge Date and Time:  11/04/2023 8:39 AM  Attending Physician: James Trinidad MD   Discharging Provider: James Trinidad MD  Primary Care Provider: James Trinidad MD    HPI: pelvic pain    Hospital Course: stable     Procedure(s) (LRB):  HYSTERECTOMY, TOTAL, ABDOMINAL, WITH BILATERAL SALPINGO-OOPHORECTOMY (N/A)     Consults:     Significant Diagnostic Studies: Labs: All labs within the past 24 hours have been reviewed    Pending Diagnostic Studies:       Procedure Component Value Units Date/Time    Pregnancy, urine rapid [[id number]]     Order Status: Sent Lab Status: No result     Specimen: Urine     Specimen to Pathology [[id number]] Collected: 11/02/23 0845    Order Status: Sent Lab Status: In process Updated: 11/02/23 1501    Specimen: Tissue from Uterus           Final Active Diagnoses:    Diagnosis Date Noted POA    PRINCIPAL PROBLEM:  Pelvic pain in female [R10.2] 11/04/2023 Unknown      Problems Resolved During this Admission:        Discharged Condition: stable    Disposition:     Follow Up:in 10-12 days  Pelvic rest no heavy lifting  Ambulated every 2 hours for 10 min   Call for bleedign fever or pain    Patient Instructions:   No discharge procedures on file.  Medications:  Reconciled Home Medications:      Medication List        START taking these medications      ibuprofen 600 MG tablet  Commonly known as: ADVIL,MOTRIN  Take 1 tablet (600 mg total) by mouth every 6 (six) hours as needed for Pain.     oxyCODONE-acetaminophen 5-325 mg per tablet  Commonly known as: PERCOCET  Take 1 tablet by mouth every 6 (six) hours as needed for Pain.            ASK your doctor about these medications      amoxicillin 500 MG capsule  Commonly known as: AMOXIL  Take 500 mg by mouth 4 (four) times daily.      atorvastatin 10 MG tablet  Commonly known as: LIPITOR  Take 10 mg by mouth once daily.     ELURYNG 0.12-0.015 mg/24 hr vaginal ring  Generic drug: etonogestreL-ethinyl estradioL  SMARTSI Ring Vaginal Every 4 Weeks     EScitalopram oxalate 5 MG Tab  Commonly known as: LEXAPRO  Take 5 mg by mouth every morning.     ferrous sulfate 325 (65 FE) MG EC tablet  Take 1 tablet (325 mg total) by mouth once daily.     traZODone 150 MG tablet  Commonly known as: DESYREL  Take 150 mg by mouth every evening.              James Trinidad MD  Obstetrics & Gynecology  [facility] - 3rd Floor Mother/Baby Unit

## 2023-11-07 LAB — PSYCHE PATHOLOGY RESULT: NORMAL

## 2023-11-13 NOTE — PHYSICIAN QUERY
PT Name: Monika Kimble  MR #: [id number]    DOCUMENTATION CLARIFICATION     CDS/: Scarlett Zuñiga               Contact information: [email or web address]  This form is a permanent document in the medical record.     Query Date: November 13, 2023    By submitting this query, we are merely seeking further clarification of documentation.  Please utilize your independent clinical judgment when addressing the question(s) below.    The medical record contains the following:  Pathology Findings Location in Medical Record    -UTERUS, CERVIX, AND UNDESIGNATED OVARY AND FALLOPIAN TUBE, HYSTERECTOMY WITH   UNILATERAL OOPHORECTOMY:        -ADENOMYOSIS.       -OVARIAN SEROUS CYSTADENOFIBROMA.        -WEAKLY PROLIFERATIVE ENDOMETRIUM.    -CHRONIC CERVICITIS.    - BENIGN FALLOPIAN TUBE, ADHERENT TO OVARY.     - Source    UTERUS        Clinical Information    PELVIC PAIN IN FEMALE    FOLLICULAR CYST OF OVARY    MENORRHAGIA                Pathology note 11/02/2023       Please clarify the pathology findings.  [  ] Pathology findings noted above are ruled in/confirmed as diagnoses   [  ] Pathology findings noted above are not confirmed as diagnoses   [  ] Pathology findings noted above are incidental   [  ] Other diagnosis (please specify): ___________   [ {Click F2; select 'X' if Clinically Undetermined:[id number]} ] Clinically Undetermined     Please document in your progress notes daily for the duration of treatment until resolved and include in your discharge summary.    Form No. [id number]

## 2023-11-14 NOTE — PHYSICIAN QUERY
PT Name: Monika Kimble  MR #: [id number]    DOCUMENTATION CLARIFICATION     CDS/: Scarlett Zuñiga               Contact information: [email or web address]  This form is a permanent document in the medical record.     Query Date: November 14, 2023    By submitting this query, we are merely seeking further clarification of documentation.  Please utilize your independent clinical judgment when addressing the question(s) below.    The medical record contains the following:  Pathology Findings Location in Medical Record    -UTERUS, CERVIX, AND UNDESIGNATED OVARY AND FALLOPIAN TUBE, HYSTERECTOMY WITH   UNILATERAL OOPHORECTOMY:        -ADENOMYOSIS.       -OVARIAN SEROUS CYSTADENOFIBROMA.        -WEAKLY PROLIFERATIVE ENDOMETRIUM.    -CHRONIC CERVICITIS.    - BENIGN FALLOPIAN TUBE, ADHERENT TO OVARY.      - Source    UTERUS        Clinical Information    PELVIC PAIN IN FEMALE    FOLLICULAR CYST OF OVARY    MENORRHAGIA              Pathology note 11/02/2023       Please clarify the pathology findings.  [ $ ] Pathology findings noted above are ruled in/confirmed as diagnoses   [  ] Pathology findings noted above are not confirmed as diagnoses   [  ] Pathology findings noted above are incidental   [  ] Other diagnosis (please specify): ___________   [  ] Clinically Undetermined     Please document in your progress notes daily for the duration of treatment until resolved and include in your discharge summary.    Form No. [id number]

## (undated) DEVICE — DRAPE FLUID WARMER 44X44IN

## (undated) DEVICE — BINDER ABD 12IN SM/MED 30-45IN

## (undated) DEVICE — SUT CTD VICRYL 2-0 UND SUTU

## (undated) DEVICE — PAD ABDOMINAL STERILE 5X9IN

## (undated) DEVICE — SOL IRRI STRL WATER 1000ML

## (undated) DEVICE — SUT JJ41G O VICRYL

## (undated) DEVICE — STAPLER SKIN SUBCUTICULAR

## (undated) DEVICE — SUT VICRYL 2-0 27 CT-1

## (undated) DEVICE — NDL HYPO REG 25G X 1 1/2

## (undated) DEVICE — APPLICATOR ARISTA FLEX XL

## (undated) DEVICE — DRAPE ORTH SPLIT 77X108IN

## (undated) DEVICE — GLOVE PROTEXIS HYDROGEL SZ8

## (undated) DEVICE — NDL HYPO 22GX1 1/2 SYR 10ML LL

## (undated) DEVICE — SUT SILK 2-0 STRANDS 30IN

## (undated) DEVICE — ELECTRODE PATIENT RETURN DISP

## (undated) DEVICE — SUT CTD VICRYL 0 UND BR

## (undated) DEVICE — DRAPE TOP 53X102IN

## (undated) DEVICE — DRESSING TELFA + RECT 6X10IN

## (undated) DEVICE — KIT SURGICAL TURNOVER

## (undated) DEVICE — KIT GYN MAJOR ABD LAFAYETTE

## (undated) DEVICE — Device

## (undated) DEVICE — SUT VICRYL 1 OB 36 CTX